# Patient Record
Sex: FEMALE | Race: WHITE | NOT HISPANIC OR LATINO | Employment: PART TIME | URBAN - METROPOLITAN AREA
[De-identification: names, ages, dates, MRNs, and addresses within clinical notes are randomized per-mention and may not be internally consistent; named-entity substitution may affect disease eponyms.]

---

## 2022-02-28 ENCOUNTER — TRANSFERRED RECORDS (OUTPATIENT)
Dept: HEALTH INFORMATION MANAGEMENT | Facility: CLINIC | Age: 67
End: 2022-02-28
Payer: COMMERCIAL

## 2022-03-01 ENCOUNTER — DOCUMENTATION ONLY (OUTPATIENT)
Dept: OTHER | Age: 67
End: 2022-03-01
Payer: COMMERCIAL

## 2022-03-01 ENCOUNTER — TRANSCRIBE ORDERS (OUTPATIENT)
Dept: ONCOLOGY | Facility: CLINIC | Age: 67
End: 2022-03-01

## 2022-03-01 ENCOUNTER — PATIENT OUTREACH (OUTPATIENT)
Dept: ONCOLOGY | Facility: CLINIC | Age: 67
End: 2022-03-01
Payer: COMMERCIAL

## 2022-03-01 ENCOUNTER — TRANSFERRED RECORDS (OUTPATIENT)
Dept: HEALTH INFORMATION MANAGEMENT | Facility: CLINIC | Age: 67
End: 2022-03-01

## 2022-03-01 DIAGNOSIS — C69.30 CHOROIDAL MALIGNANT MELANOMA (H): Primary | ICD-10-CM

## 2022-03-01 DIAGNOSIS — C69.30 MALIGNANT MELANOMA OF CHOROID, UNSPECIFIED LATERALITY (H): Primary | ICD-10-CM

## 2022-03-01 NOTE — PROGRESS NOTES
I rec'd a referral from Dr. 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 I have spoken to Barbara several times today. I sent the following e-mail with all of the information:    [facility]!    [address] [address]  Phone: [phone]  Toll-free: [phone]  ~Can do CT (chest, abdomen and pelvis) and labs.    Radiology (CT):    ~they will call you to schedule your CT.    ~I told them afternoon would be best  Phone:  [phone]    Labs:  ~once you have your date/time for the CT, please call lab appointment desk and set up you lab appointment.  Ask that it be done before or after your CT!  Lab appointment desk:  [phone]      Oncology Consult:  [address] [address]   Appointments: [phone]     Tuesday, March 22, 2022, arrival of 1:45 pm please!  1:45 pm:  rooming  2-3 pm:  meet with Dr. Claribel Price, Medical Oncologist  ~plan on about 90 minutes!    Let me know if you have any questions!  Josie Avila, RN, BSN        New Patient Oncology Nurse Navigator Note     Referring provider: Venkat Trotter M.D., Ph.D.  Vitreoretinal surgery & Ocular Oncology     Referred to (specialty): medical oncology    Requested provider (if applicable): Dr. Siu (out on leave)     Date Referral Received: 2/28/2022     Evaluation for : choroidal melanoma     Clinical History (per Nurse review of records provided):    ~see Dr. Trotter's note~      Clinical Assessment / Barriers to Care (Per Nurse):   none     Records Location (Care Everywhere, Media, etc.): see below     Records Needed: labs and CT to be done (Th 3/3/2022) at:  [address] [address]  Phone: [phone]  Toll-free: [phone]     Additional testing needed prior to consult: Th 3/10/2022  CT c/a/p & labs (CMP, cbc w/diff & LDH)

## 2022-03-01 NOTE — PROGRESS NOTES
Action 03/01/2022@305PM CDK   Action Taken REQUEST SENT TO [facility] FOR RECORDS AND IMAGING  CK

## 2022-03-03 ENCOUNTER — LAB REQUISITION (OUTPATIENT)
Dept: LAB | Facility: CLINIC | Age: 67
End: 2022-03-03

## 2022-03-03 ENCOUNTER — TRANSFERRED RECORDS (OUTPATIENT)
Dept: HEALTH INFORMATION MANAGEMENT | Facility: CLINIC | Age: 67
End: 2022-03-03
Payer: COMMERCIAL

## 2022-03-03 LAB
ALT SERPL-CCNC: 36 U/L (ref 16–63)
AST SERPL-CCNC: 30 U/L (ref 15–37)
CREATININE (EXTERNAL): 0.85 MG/DL (ref 0.6–1.3)
GFR ESTIMATED (EXTERNAL): >60 ML/MIN/1.73M2
GFR ESTIMATED (IF AFRICAN AMERICAN) (EXTERNAL): >60 ML/MIN/1.73M2
GLUCOSE (EXTERNAL): 95 MG/DL (ref 70–110)
LDH SERPL L TO P-CCNC: 269 U/L (ref 125–220)
POTASSIUM (EXTERNAL): 4.3 MMOL/L (ref 3.6–5.2)

## 2022-03-03 PROCEDURE — 83615 LACTATE (LD) (LDH) ENZYME: CPT

## 2022-03-09 NOTE — PROGRESS NOTES
RECORDS STATUS - ALL OTHER DIAGNOSIS      RECORDS RECEIVED FROM: [facility]/ [facility] in [place] / [facility] Records are in [facility]     DATE RECEIVED: 3/18/2022   NOTES STATUS DETAILS   OFFICE NOTE from referring provider     [facility] Records are in [facility]   Complete Dr. Trotter Recs March 1st 2022       DISCHARGE SUMMARY from hospital     DISCHARGE REPORT from the ER     OPERATIVE REPORT     CLINICAL TRIAL TREATMENTS TO DATE     LABS     PATHOLOGY REPORTS     ANYTHING RELATED TO DIAGNOSIS [facility]  Records are in [facility]- 3/3/2022   GENONOMIC TESTING     TYPE:     IMAGING (NEED IMAGES & REPORT)     CT SCANS Complete- [place]   [employer] Tracking Number:   [id number] CT Chest 3/3/2022    CT abdomen Pelvis 3/3/2022    MRI     MAMMO     ULTRASOUND     PET       Action    Action Taken 3/9/2022 2:45pm FORTINO   I called [facility] in [place] Phone: [phone] - I spoke to medical records and they looked up pt Barbara - they will fax over labs, office notes and image reports.     I was transferred to the radiology dept- they will need to mail out the image disc.     Ph: [phone]   Fax: [phone]    Attn: Radiology   Mailing Address:   [address] [place] [address]     [employer] Tracking Number: [id number]    3:17pm FORTINO   I faxed records from [place] to [facility]    3/16/2022 10:55AM FORTINO   I called pt Barbara - she mentioned having an appt on Feb 28th [facility] in [place] PH: [phone]     I called [facility] in [place] PH: [phone]   - they will fax over records from Feb 28th!

## 2022-03-18 ENCOUNTER — ONCOLOGY VISIT (OUTPATIENT)
Dept: ONCOLOGY | Facility: CLINIC | Age: 67
End: 2022-03-18
Attending: OPHTHALMOLOGY
Payer: COMMERCIAL

## 2022-03-18 ENCOUNTER — PATIENT OUTREACH (OUTPATIENT)
Dept: ONCOLOGY | Facility: CLINIC | Age: 67
End: 2022-03-18
Payer: COMMERCIAL

## 2022-03-18 ENCOUNTER — PRE VISIT (OUTPATIENT)
Dept: ONCOLOGY | Facility: CLINIC | Age: 67
End: 2022-03-18
Payer: COMMERCIAL

## 2022-03-18 VITALS
OXYGEN SATURATION: 96 % | HEIGHT: 65 IN | BODY MASS INDEX: 18.36 KG/M2 | SYSTOLIC BLOOD PRESSURE: 112 MMHG | DIASTOLIC BLOOD PRESSURE: 79 MMHG | RESPIRATION RATE: 16 BRPM | WEIGHT: 110.2 LBS | HEART RATE: 77 BPM

## 2022-03-18 DIAGNOSIS — C69.31 MALIGNANT MELANOMA OF CHOROID OF RIGHT EYE (H): ICD-10-CM

## 2022-03-18 PROCEDURE — G0463 HOSPITAL OUTPT CLINIC VISIT: HCPCS

## 2022-03-18 PROCEDURE — 99205 OFFICE O/P NEW HI 60 MIN: CPT | Performed by: INTERNAL MEDICINE

## 2022-03-18 ASSESSMENT — PAIN SCALES - GENERAL: PAINLEVEL: NO PAIN (0)

## 2022-03-18 NOTE — PROGRESS NOTES
"Oncology Rooming Note    March 18, 2022 11:12 AM   Barbara Singleton is a 66 year old female who presents for:    Chief Complaint   Patient presents with     Oncology Clinic Visit     new consult      Initial Vitals: /79   Pulse 77   Resp 16   Ht 1.651 m (5' 5\")   Wt 50 kg (110 lb 3.2 oz)   SpO2 96%   BMI 18.34 kg/m   Estimated body mass index is 18.34 kg/m  as calculated from the following:    Height as of this encounter: 1.651 m (5' 5\").    Weight as of this encounter: 50 kg (110 lb 3.2 oz). Body surface area is 1.51 meters squared.  No Pain (0) Comment: Data Unavailable   No LMP recorded.  Allergies reviewed: Yes  Medications reviewed: Yes    Medications: Medication refills not needed today.  Pharmacy name entered into [employer]: [id number] - [place], [place] - [address] [facility] AT [facility] [address]    Clinical concerns: new consult     Yanelis Steel            "

## 2022-03-18 NOTE — PROGRESS NOTES
Introduced self to patient and patient's daughter, Yoly. Explained by role as RN Care Coordinator. Contact information given.  Patient will follow-up ~ 4 months after treatment with Venkat Trotter M.D., Ph.D. Vitreoretinal surgery & Ocular Oncology/SHARI Bowen RN

## 2022-03-18 NOTE — LETTER
"    3/18/2022         RE: Barbara Singleton  [address] [facility] [address]        Dear Colleague,    Thank you for referring your patient, Barbara Singleton, to the [facility]. Please see a copy of my visit note below.    Oncology Rooming Note    March 18, 2022 11:12 AM   Barbara Singleton is a 66 year old female who presents for:    Chief Complaint   Patient presents with     Oncology Clinic Visit     new consult      Initial Vitals: /79   Pulse 77   Resp 16   Ht 1.651 m (5' 5\")   Wt 50 kg (110 lb 3.2 oz)   SpO2 96%   BMI 18.34 kg/m   Estimated body mass index is 18.34 kg/m  as calculated from the following:    Height as of this encounter: 1.651 m (5' 5\").    Weight as of this encounter: 50 kg (110 lb 3.2 oz). Body surface area is 1.51 meters squared.  No Pain (0) Comment: Data Unavailable   No LMP recorded.  Allergies reviewed: Yes  Medications reviewed: Yes    Medications: Medication refills not needed today.  Pharmacy name entered into [employer]: [facility] #[id number] [address] AT [facility]    Clinical concerns: new consult     Yanelis Steel              [facility] Hematology and Oncology Consult Note    Patient: Barbara Singleton  MRN: [id number]  Date of Service: Mar 18, 2022       Reason for Visit    Chief Complaint   Patient presents with     Oncology Clinic Visit     new consult          Assessment/Plan    #. Choroidal melanoma of the right eye  #. Weight loss of about 15 lbs likely from emotional distress.      Reviewed the clinical course, medical records. I reviewed labs including CBC, CMP, LDH and they are all unremarkable expect mildly elevated LDH. CT scan of chest/abd/pelvis was reviewed and there is no signs of metastatic melanoma. She was happy to hear that. I recommended to proceed with local treatment for primary choroidal melanoma per Dr. Trotter.   There is a risk of metastatic disease that I recommend systemic " imaging surveillance after completion of local treatment in addition to ophthalmology follow up.    Follow up in about 4-6 months and generally every 6-12 months surveillance exam and imaging by CT or CXR/US liver for 10 years, then clinically indicated according to NCCN guidelines.       ECOG Performance 0 - Independent    Encounter Diagnoses:    Problem List Items Addressed This Visit     None      Visit Diagnoses     Malignant melanoma of choroid of right eye (H)              ______________________________________________________________________________    Staging History  Cancer Staging  No matching staging information was found for the patient.      History    Ms. Barbara Singleton is a very pleasant 66 year old presented today accompanied by her daughter, Yoly.  She first presented with blurry vision of the right eye with flashes for a couple weeks duration. Further ophthalmologic evaluation concluded choroidal melanoma. She is here to discuss about the systemic disease evaluation piror treatment of choroidal melanoma. I requested labs and CT scan prior to this visit and she completed on 3/3/2022.    She normally weighs around 125 lbs. She lost some weight recently due to stress related to her marriage. Her appetite is good. Her energy level is at baseline. No headaches. No bone pain.    She has food intolerance/allergy. She presented with stomach pain, bloating but no diarrhea. She is following with an allergist.   She had left wrist surgery and bone graft in 2010.   Post hysterectomy in 1994.     She has farm and garden. She works in [employer] at [employer].She does not smoke. She drinks alcohol occasionally.   She has 2 daughters and 5 grandkids.  No family history of cancer.    Review of systems.  Apart from describing in history, the remainder of comprehensive ROS was negative.    Past History    No past medical history on file.  No past surgical history on file.  No family history on file.  Social  "History     Socioeconomic History     Marital status:      Spouse name: None     Number of children: None     Years of education: None     Highest education level: None   Occupational History     None   Tobacco Use     Smoking status: Never Smoker     Smokeless tobacco: Never Used   Substance and Sexual Activity     Alcohol use: Not Currently     Drug use: Not Currently     Sexual activity: None   Other Topics Concern     None   Social History Narrative     None     Social Determinants of Health     Financial Resource Strain: Not on file   Food Insecurity: Not on file   Transportation Needs: Not on file   Physical Activity: Not on file   Stress: Not on file   Social Connections: Not on file   Intimate Partner Violence: Not on file   Housing Stability: Not on file       Allergies    No Known Allergies    Physical Exam    /79   Pulse 77   Resp 16   Ht 1.651 m (5' 5\")   Wt 50 kg (110 lb 3.2 oz)   SpO2 96%   BMI 18.34 kg/m      General: alert, awake, not in acute distress. Thin female  HEENT: Head: Normal, normocephalic, atraumatic.  Eye: Normal external eye, conjunctiva, lids cornea, KASH.  Nose: Normal external nose, mucus membranes and septum.  Pharynx: Normal buccal mucosa. Normal pharynx.  Neck / Thyroid: Supple, no masses, nodes, nodules or enlargement.  Lymphatics: No abnormally enlarged lymph nodes.  Chest: Normal chest wall and respirations. Clear to auscultation.  Heart: S1 S2 RRR, no murmur.   Abdomen: abdomen is soft without significant tenderness, masses, organomegaly or guarding  Extremities: normal strength, tone, and muscle mass  Skin: normal. no rash or abnormalities  CNS: non focal.    Lab Results    No results found for this or any previous visit (from the past 168 hour(s)).    Imaging Results    No results found.    I spent 60 minutes on the date of service, of which greater than 50% of the time was spent on counseling of the patent about the above medical conditions, educating " patient on the medical conditions, treatment plan as well as coordination of care.    Signed by: Claribel Price MD         Again, thank you for allowing me to participate in the care of your patient.        Sincerely,        Claribel Price MD

## 2022-03-24 NOTE — PROGRESS NOTES
[facility] Hematology and Oncology Consult Note    Patient: Barbara Singleton  MRN: [id number]  Date of Service: Mar 18, 2022       Reason for Visit    Chief Complaint   Patient presents with     Oncology Clinic Visit     new consult          Assessment/Plan    #. Choroidal melanoma of the right eye  #. Weight loss of about 15 lbs likely from emotional distress.      Reviewed the clinical course, medical records. I reviewed labs including CBC, CMP, LDH and they are all unremarkable expect mildly elevated LDH. CT scan of chest/abd/pelvis was reviewed and there is no signs of metastatic melanoma. She was happy to hear that. I recommended to proceed with local treatment for primary choroidal melanoma per Dr. Trotter.   There is a risk of metastatic disease that I recommend systemic imaging surveillance after completion of local treatment in addition to ophthalmology follow up.    Follow up in about 4-6 months and generally every 6-12 months surveillance exam and imaging by CT or CXR/US liver for 10 years, then clinically indicated according to NCCN guidelines.       ECOG Performance 0 - Independent    Encounter Diagnoses:    Problem List Items Addressed This Visit     None      Visit Diagnoses     Malignant melanoma of choroid of right eye (H)              ______________________________________________________________________________    Staging History  Cancer Staging  No matching staging information was found for the patient.      History    Ms. Barbara Singleton is a very pleasant 66 year old presented today accompanied by her daughter, Yoly.  She first presented with blurry vision of the right eye with flashes for a couple weeks duration. Further ophthalmologic evaluation concluded choroidal melanoma. She is here to discuss about the systemic disease evaluation piror treatment of choroidal melanoma. I requested labs and CT scan prior to this visit and she completed on 3/3/2022.    She normally weighs around 125  "lbs. She lost some weight recently due to stress related to her marriage. Her appetite is good. Her energy level is at baseline. No headaches. No bone pain.    She has food intolerance/allergy. She presented with stomach pain, bloating but no diarrhea. She is following with an allergist.   She had left wrist surgery and bone graft in 2010.   Post hysterectomy in 1994.     She has farm and garden. She works in [employer] at [employer].She does not smoke. She drinks alcohol occasionally.   She has 2 daughters and 5 grandkids.  No family history of cancer.    Review of systems.  Apart from describing in history, the remainder of comprehensive ROS was negative.    Past History    No past medical history on file.  No past surgical history on file.  No family history on file.  Social History     Socioeconomic History     Marital status:      Spouse name: None     Number of children: None     Years of education: None     Highest education level: None   Occupational History     None   Tobacco Use     Smoking status: Never Smoker     Smokeless tobacco: Never Used   Substance and Sexual Activity     Alcohol use: Not Currently     Drug use: Not Currently     Sexual activity: None   Other Topics Concern     None   Social History Narrative     None     Social Determinants of Health     Financial Resource Strain: Not on file   Food Insecurity: Not on file   Transportation Needs: Not on file   Physical Activity: Not on file   Stress: Not on file   Social Connections: Not on file   Intimate Partner Violence: Not on file   Housing Stability: Not on file       Allergies    No Known Allergies    Physical Exam    /79   Pulse 77   Resp 16   Ht 1.651 m (5' 5\")   Wt 50 kg (110 lb 3.2 oz)   SpO2 96%   BMI 18.34 kg/m      General: alert, awake, not in acute distress. Thin female  HEENT: Head: Normal, normocephalic, atraumatic.  Eye: Normal external eye, conjunctiva, lids cornea, KASH.  Nose: Normal external nose, mucus " membranes and septum.  Pharynx: Normal buccal mucosa. Normal pharynx.  Neck / Thyroid: Supple, no masses, nodes, nodules or enlargement.  Lymphatics: No abnormally enlarged lymph nodes.  Chest: Normal chest wall and respirations. Clear to auscultation.  Heart: S1 S2 RRR, no murmur.   Abdomen: abdomen is soft without significant tenderness, masses, organomegaly or guarding  Extremities: normal strength, tone, and muscle mass  Skin: normal. no rash or abnormalities  CNS: non focal.    Lab Results    No results found for this or any previous visit (from the past 168 hour(s)).    Imaging Results    No results found.    I spent 60 minutes on the date of service, of which greater than 50% of the time was spent on counseling of the patent about the above medical conditions, educating patient on the medical conditions, treatment plan as well as coordination of care.    Signed by: Claribel Price MD

## 2022-04-05 ENCOUNTER — TRANSFERRED RECORDS (OUTPATIENT)
Dept: HEALTH INFORMATION MANAGEMENT | Facility: CLINIC | Age: 67
End: 2022-04-05
Payer: COMMERCIAL

## 2022-04-06 DIAGNOSIS — Z11.59 ENCOUNTER FOR SCREENING FOR OTHER VIRAL DISEASES: Primary | ICD-10-CM

## 2022-04-11 ENCOUNTER — TRANSFERRED RECORDS (OUTPATIENT)
Dept: HEALTH INFORMATION MANAGEMENT | Facility: CLINIC | Age: 67
End: 2022-04-11
Payer: COMMERCIAL

## 2022-04-11 ENCOUNTER — LAB REQUISITION (OUTPATIENT)
Dept: LAB | Facility: CLINIC | Age: 67
End: 2022-04-11

## 2022-04-11 DIAGNOSIS — Z01.818 ENCOUNTER FOR OTHER PREPROCEDURAL EXAMINATION: ICD-10-CM

## 2022-04-11 LAB
CREATININE (EXTERNAL): 0.8 MG/DL (ref 0.6–1.3)
GFR ESTIMATED (EXTERNAL): >60 ML/MIN/1.73M2
GFR ESTIMATED (IF AFRICAN AMERICAN) (EXTERNAL): >60 ML/MIN/1.73M2
GLUCOSE (EXTERNAL): 100 MG/DL (ref 70–110)
POTASSIUM (EXTERNAL): 5.2 MMOL/L (ref 3.6–5.2)

## 2022-04-11 PROCEDURE — U0003 INFECTIOUS AGENT DETECTION BY NUCLEIC ACID (DNA OR RNA); SEVERE ACUTE RESPIRATORY SYNDROME CORONAVIRUS 2 (SARS-COV-2) (CORONAVIRUS DISEASE [COVID-19]), AMPLIFIED PROBE TECHNIQUE, MAKING USE OF HIGH THROUGHPUT TECHNOLOGIES AS DESCRIBED BY CMS-2020-01-R: HCPCS | Performed by: FAMILY MEDICINE

## 2022-04-12 LAB — SARS-COV-2 RNA RESP QL NAA+PROBE: NEGATIVE

## 2022-04-14 ENCOUNTER — ANESTHESIA EVENT (OUTPATIENT)
Dept: SURGERY | Facility: CLINIC | Age: 67
End: 2022-04-14
Payer: COMMERCIAL

## 2022-04-15 ENCOUNTER — HOSPITAL ENCOUNTER (OUTPATIENT)
Facility: CLINIC | Age: 67
Discharge: HOME | End: 2022-04-15
Attending: OPHTHALMOLOGY | Admitting: OPHTHALMOLOGY
Payer: COMMERCIAL

## 2022-04-15 ENCOUNTER — ANESTHESIA (OUTPATIENT)
Dept: SURGERY | Facility: CLINIC | Age: 67
End: 2022-04-15
Payer: COMMERCIAL

## 2022-04-15 VITALS
HEART RATE: 92 BPM | RESPIRATION RATE: 11 BRPM | OXYGEN SATURATION: 96 % | SYSTOLIC BLOOD PRESSURE: 116 MMHG | TEMPERATURE: 99 F | HEIGHT: 65 IN | BODY MASS INDEX: 18.36 KG/M2 | WEIGHT: 110.2 LBS | DIASTOLIC BLOOD PRESSURE: 57 MMHG

## 2022-04-15 DIAGNOSIS — C69.31 MALIGNANT MELANOMA OF CHOROID OF RIGHT EYE (H): Primary | ICD-10-CM

## 2022-04-15 PROCEDURE — C1713 ANCHOR/SCREW BN/BN,TIS/BN: HCPCS | Performed by: OPHTHALMOLOGY

## 2022-04-15 PROCEDURE — 360N000076 HC SURGERY LEVEL 3, PER MIN: Performed by: OPHTHALMOLOGY

## 2022-04-15 PROCEDURE — 370N000017 HC ANESTHESIA TECHNICAL FEE, PER MIN: Performed by: OPHTHALMOLOGY

## 2022-04-15 PROCEDURE — 250N000009 HC RX 250: Performed by: OPHTHALMOLOGY

## 2022-04-15 PROCEDURE — 710N000012 HC RECOVERY PHASE 2, PER MINUTE: Performed by: OPHTHALMOLOGY

## 2022-04-15 PROCEDURE — 250N000025 HC SEVOFLURANE, PER MIN: Performed by: OPHTHALMOLOGY

## 2022-04-15 PROCEDURE — 250N000011 HC RX IP 250 OP 636: Performed by: OPHTHALMOLOGY

## 2022-04-15 PROCEDURE — 999N000141 HC STATISTIC PRE-PROCEDURE NURSING ASSESSMENT: Performed by: OPHTHALMOLOGY

## 2022-04-15 PROCEDURE — 258N000003 HC RX IP 258 OP 636: Performed by: ANESTHESIOLOGY

## 2022-04-15 PROCEDURE — 710N000010 HC RECOVERY PHASE 1, LEVEL 2, PER MIN: Performed by: OPHTHALMOLOGY

## 2022-04-15 PROCEDURE — 258N000003 HC RX IP 258 OP 636: Performed by: NURSE ANESTHETIST, CERTIFIED REGISTERED

## 2022-04-15 PROCEDURE — 250N000011 HC RX IP 250 OP 636: Performed by: NURSE ANESTHETIST, CERTIFIED REGISTERED

## 2022-04-15 PROCEDURE — 250N000009 HC RX 250: Performed by: NURSE ANESTHETIST, CERTIFIED REGISTERED

## 2022-04-15 PROCEDURE — 272N000001 HC OR GENERAL SUPPLY STERILE: Performed by: OPHTHALMOLOGY

## 2022-04-15 RX ORDER — SODIUM CHLORIDE, SODIUM LACTATE, POTASSIUM CHLORIDE, CALCIUM CHLORIDE 600; 310; 30; 20 MG/100ML; MG/100ML; MG/100ML; MG/100ML
INJECTION, SOLUTION INTRAVENOUS CONTINUOUS
Status: DISCONTINUED | OUTPATIENT
Start: 2022-04-15 | End: 2022-04-15 | Stop reason: HOSPADM

## 2022-04-15 RX ORDER — DEXAMETHASONE SODIUM PHOSPHATE 10 MG/ML
INJECTION, SOLUTION INTRAMUSCULAR; INTRAVENOUS PRN
Status: DISCONTINUED | OUTPATIENT
Start: 2022-04-15 | End: 2022-04-15

## 2022-04-15 RX ORDER — HYDROCODONE BITARTRATE AND ACETAMINOPHEN 5; 325 MG/1; MG/1
1 TABLET ORAL EVERY 6 HOURS PRN
Qty: 18 TABLET | Refills: 0 | Status: SHIPPED | OUTPATIENT
Start: 2022-04-15 | End: 2022-04-18

## 2022-04-15 RX ORDER — FENTANYL CITRATE 50 UG/ML
25 INJECTION, SOLUTION INTRAMUSCULAR; INTRAVENOUS EVERY 5 MIN PRN
Status: DISCONTINUED | OUTPATIENT
Start: 2022-04-15 | End: 2022-04-15 | Stop reason: HOSPADM

## 2022-04-15 RX ORDER — HYDROMORPHONE HCL IN WATER/PF 6 MG/30 ML
0.2 PATIENT CONTROLLED ANALGESIA SYRINGE INTRAVENOUS EVERY 5 MIN PRN
Status: DISCONTINUED | OUTPATIENT
Start: 2022-04-15 | End: 2022-04-15 | Stop reason: HOSPADM

## 2022-04-15 RX ORDER — PROPOFOL 10 MG/ML
INJECTION, EMULSION INTRAVENOUS PRN
Status: DISCONTINUED | OUTPATIENT
Start: 2022-04-15 | End: 2022-04-15

## 2022-04-15 RX ORDER — ONDANSETRON 4 MG/1
4 TABLET, ORALLY DISINTEGRATING ORAL EVERY 30 MIN PRN
Status: DISCONTINUED | OUTPATIENT
Start: 2022-04-15 | End: 2022-04-15 | Stop reason: HOSPADM

## 2022-04-15 RX ORDER — EPHEDRINE SULFATE 50 MG/ML
INJECTION, SOLUTION INTRAMUSCULAR; INTRAVENOUS; SUBCUTANEOUS PRN
Status: DISCONTINUED | OUTPATIENT
Start: 2022-04-15 | End: 2022-04-15

## 2022-04-15 RX ORDER — OXYCODONE HYDROCHLORIDE 5 MG/1
5 TABLET ORAL EVERY 4 HOURS PRN
Status: DISCONTINUED | OUTPATIENT
Start: 2022-04-15 | End: 2022-04-15 | Stop reason: HOSPADM

## 2022-04-15 RX ORDER — ONDANSETRON 2 MG/ML
4 INJECTION INTRAMUSCULAR; INTRAVENOUS EVERY 30 MIN PRN
Status: DISCONTINUED | OUTPATIENT
Start: 2022-04-15 | End: 2022-04-15 | Stop reason: HOSPADM

## 2022-04-15 RX ORDER — LIDOCAINE 40 MG/G
CREAM TOPICAL
Status: DISCONTINUED | OUTPATIENT
Start: 2022-04-15 | End: 2022-04-15 | Stop reason: HOSPADM

## 2022-04-15 RX ORDER — ERYTHROMYCIN 5 MG/G
OINTMENT OPHTHALMIC
Qty: 7 G | Refills: 1 | Status: SHIPPED | OUTPATIENT
Start: 2022-04-15

## 2022-04-15 RX ORDER — GLYCOPYRROLATE 0.2 MG/ML
INJECTION, SOLUTION INTRAMUSCULAR; INTRAVENOUS PRN
Status: DISCONTINUED | OUTPATIENT
Start: 2022-04-15 | End: 2022-04-15

## 2022-04-15 RX ORDER — FENTANYL CITRATE 50 UG/ML
INJECTION, SOLUTION INTRAMUSCULAR; INTRAVENOUS PRN
Status: DISCONTINUED | OUTPATIENT
Start: 2022-04-15 | End: 2022-04-15

## 2022-04-15 RX ORDER — LIDOCAINE HYDROCHLORIDE 10 MG/ML
INJECTION, SOLUTION INFILTRATION; PERINEURAL PRN
Status: DISCONTINUED | OUTPATIENT
Start: 2022-04-15 | End: 2022-04-15

## 2022-04-15 RX ORDER — FENTANYL CITRATE 50 UG/ML
25 INJECTION, SOLUTION INTRAMUSCULAR; INTRAVENOUS
Status: DISCONTINUED | OUTPATIENT
Start: 2022-04-15 | End: 2022-04-15 | Stop reason: HOSPADM

## 2022-04-15 RX ORDER — KETAMINE HYDROCHLORIDE 50 MG/ML
INJECTION, SOLUTION INTRAMUSCULAR; INTRAVENOUS PRN
Status: DISCONTINUED | OUTPATIENT
Start: 2022-04-15 | End: 2022-04-15

## 2022-04-15 RX ADMIN — PHENYLEPHRINE HYDROCHLORIDE 100 MCG: 10 INJECTION INTRAVENOUS at 08:04

## 2022-04-15 RX ADMIN — FENTANYL CITRATE 50 MCG: 50 INJECTION, SOLUTION INTRAMUSCULAR; INTRAVENOUS at 08:05

## 2022-04-15 RX ADMIN — PHENYLEPHRINE HYDROCHLORIDE 100 MCG: 10 INJECTION INTRAVENOUS at 07:58

## 2022-04-15 RX ADMIN — PROPOFOL 160 MG: 10 INJECTION, EMULSION INTRAVENOUS at 07:55

## 2022-04-15 RX ADMIN — GLYCOPYRROLATE 0.2 MG: 0.2 INJECTION, SOLUTION INTRAMUSCULAR; INTRAVENOUS at 07:57

## 2022-04-15 RX ADMIN — DEXAMETHASONE SODIUM PHOSPHATE 10 MG: 10 INJECTION, SOLUTION INTRAMUSCULAR; INTRAVENOUS at 07:55

## 2022-04-15 RX ADMIN — FENTANYL CITRATE 50 MCG: 50 INJECTION, SOLUTION INTRAMUSCULAR; INTRAVENOUS at 07:57

## 2022-04-15 RX ADMIN — Medication 10 MG: at 07:56

## 2022-04-15 RX ADMIN — PHENYLEPHRINE HYDROCHLORIDE 100 MCG: 10 INJECTION INTRAVENOUS at 08:11

## 2022-04-15 RX ADMIN — LIDOCAINE HYDROCHLORIDE 20 MG: 10 INJECTION, SOLUTION INFILTRATION; PERINEURAL at 07:55

## 2022-04-15 RX ADMIN — Medication 10 MG: at 08:39

## 2022-04-15 RX ADMIN — HYDROMORPHONE HYDROCHLORIDE 0.5 MG: 1 INJECTION, SOLUTION INTRAMUSCULAR; INTRAVENOUS; SUBCUTANEOUS at 09:03

## 2022-04-15 RX ADMIN — SODIUM CHLORIDE, POTASSIUM CHLORIDE, SODIUM LACTATE AND CALCIUM CHLORIDE: 600; 310; 30; 20 INJECTION, SOLUTION INTRAVENOUS at 08:54

## 2022-04-15 RX ADMIN — PHENYLEPHRINE HYDROCHLORIDE 100 MCG: 10 INJECTION INTRAVENOUS at 08:18

## 2022-04-15 RX ADMIN — MIDAZOLAM 1 MG: 1 INJECTION INTRAMUSCULAR; INTRAVENOUS at 07:43

## 2022-04-15 RX ADMIN — SODIUM CHLORIDE, POTASSIUM CHLORIDE, SODIUM LACTATE AND CALCIUM CHLORIDE: 600; 310; 30; 20 INJECTION, SOLUTION INTRAVENOUS at 06:58

## 2022-04-15 RX ADMIN — Medication 5 MG: at 08:52

## 2022-04-15 RX ADMIN — PHENYLEPHRINE HYDROCHLORIDE 50 MCG: 10 INJECTION INTRAVENOUS at 08:53

## 2022-04-15 RX ADMIN — KETAMINE HYDROCHLORIDE 25 MG: 50 INJECTION, SOLUTION INTRAMUSCULAR; INTRAVENOUS at 08:07

## 2022-04-15 NOTE — OP NOTE
PREOPERATIVE DIAGNOSIS: Melanoma, right eye    POSTOPERATIVE DIAGNOSIS: Melanoma, right eye    PROCEDURE: Right eye enucleation with placement of a Medpor SST-EZ 20mm implant with the muscles attached.     SURGEON: Mya Posadas MD     ANESTHESIA: General anesthesia.     COMPLICATIONS: None.     SPECIMEN:Right eye for pathologic evaluation.     ESTIMATED BLOOD LOSS: Less than 5 mL.     HISTORY AND INDICATIONS: Barbara presented with a painful blind right eye. After the risks, benefits and alternatives to the proposed procedure were explained, informed consent was obtained.     DESCRIPTION OF PROCEDURE: Barbara was brought to the operating room and placed supine on the operating table. General anesthesia was induced. The right  eye was prepped and draped in the typical sterile ophthalmic fashion. Attention was directed to the right  side. A Garcia lid speculum was placed to separate the eyelids. A 360-degree conjunctival peritomy was performed with the christiano scissors. Dissection was carried into the quadrants with the Petersen tenotomy scissors. Each rectus muscle was hooked with the Joe muscle hook,  tagged with a double-armed 5-0 Vicryl suture and cut free from the globe. The inferior oblique muscle was identified, cauterized and cut free from the globe. The optic nerve was clamped with a hemostat and severed with the enucleation scissors. The hemostasis was obtained with pressure and bipolar cautery. The implant sizer spheres were used to determine the correct implant size and a 20 mm implant was selected.  The implant was placed in the antibiotic solution. The implant was placed in the socket using the sphere introducer. The rectus muscles were sutured their respective muscle windows in the implant with the pre-placed 5-0 Vicryl sutures. Tenon's layer was closed with interrupted buried 5-0 Vicryl sutures. The conjunctiva was closed with running 6-0 plain gut suture. Antibiotic ointment, a conformer and a  pressure patch were placed. The patient tolerated the procedure well and  left the operating room in stable condition after being awoken from general anesthesia.

## 2022-04-15 NOTE — ANESTHESIA CARE TRANSFER NOTE
Patient: Barbara Singleton    Procedure: Procedure(s):  ENUCLEATION OR RIGHT EYE       Diagnosis: Choroid melanoma of right eye (H) [C69.31]  Diagnosis Additional Information: No value filed.    Anesthesia Type:   General     Note:    Oropharynx: oropharynx clear of all foreign objects  Level of Consciousness: awake  Oxygen Supplementation: face mask  Level of Supplemental Oxygen (L/min / FiO2): 6  Independent Airway: airway patency satisfactory and stable  Dentition: dentition unchanged  Vital Signs Stable: post-procedure vital signs reviewed and stable  Report to RN Given: handoff report given  Patient transferred to: PACU    Handoff Report: Identifed the Patient, Identified the Reponsible Provider, Reviewed the pertinent medical history, Discussed the surgical course, Reviewed Intra-OP anesthesia mangement and issues during anesthesia, Set expectations for post-procedure period and Allowed opportunity for questions and acknowledgement of understanding      Vitals:  Vitals Value Taken Time   /57 04/15/22 0926   Temp 37.3  C (99.1  F) 04/15/22 0926   Pulse 98 04/15/22 0927   Resp 4 04/15/22 0927   SpO2 99 % 04/15/22 0927   Vitals shown include unvalidated device data.    Electronically Signed By: JODIE Ignacio CRNA  April 15, 2022  9:29 AM

## 2022-04-15 NOTE — BRIEF OP NOTE
[facility]    Brief Operative Note    Pre-operative diagnosis: Choroid melanoma of right eye (H) [C69.31]  Post-operative diagnosis Same as pre-operative diagnosis    Procedure: Procedure(s):  ENUCLEATION OR RIGHT EYE  Surgeon: Surgeon(s) and Role:     * Mya Posadas MD - Primary  Anesthesia: General   Estimated Blood Loss: Minimal    Drains: None  Specimens:   ID Type Source Tests Collected by Time Destination   1 : Send fresh to [facility] c\o Kim Huang for castle studies Tissue Eye, Right SURGICAL PATHOLOGY EXAM Mya Posadas MD 4/15/2022  8:34 AM      Findings:   None.  Complications: None.  Implants:   Implant Name Type Inv. Item Serial No.  Lot No. LRB No. Used Action   Medpor surgical implant shpere with enucleation implant      [id number] Right 1 Implanted

## 2022-04-15 NOTE — ANESTHESIA PREPROCEDURE EVALUATION
Anesthesia Pre-Procedure Evaluation    Patient: Barbara Singleton   MRN: [id number] : 1955        Procedure : Procedure(s):  ENUCLEATION OR RIGHT EYE          History reviewed. No pertinent past medical history.   Past Surgical History:   Procedure Laterality Date     GYN SURGERY       HYSTERECTOMY        No Known Allergies   Social History     Tobacco Use     Smoking status: Never Smoker     Smokeless tobacco: Never Used   Substance Use Topics     Alcohol use: Not Currently      Wt Readings from Last 1 Encounters:   04/15/22 50 kg (110 lb 3.2 oz)        Anesthesia Evaluation   Pt has had prior anesthetic.     No history of anesthetic complications       ROS/MED HX  ENT/Pulmonary:  - neg pulmonary ROS     Neurologic:  - neg neurologic ROS     Cardiovascular:  - neg cardiovascular ROS  (-) murmur and wheezes   METS/Exercise Tolerance: >4 METS    Hematologic:  - neg hematologic  ROS     Musculoskeletal:  - neg musculoskeletal ROS     GI/Hepatic:  - neg GI/hepatic ROS     Renal/Genitourinary:  - neg Renal ROS     Endo:  - neg endo ROS     Psychiatric/Substance Use:  - neg psychiatric ROS     Infectious Disease:  - neg infectious disease ROS     Malignancy:  - neg malignancy ROS     Other:  - neg other ROS          Physical Exam    Airway  airway exam normal      Mallampati: I   TM distance: > 3 FB   Neck ROM: full   Mouth opening: > 3 cm    Respiratory Devices and Support         Dental  no notable dental history         Cardiovascular          Rhythm and rate: regular and normal (-) no murmur    Pulmonary           breath sounds clear to auscultation   (-) no wheezes        OUTSIDE LABS:  CBC: No results found for: WBC, HGB, HCT, PLT  BMP: No results found for: NA, POTASSIUM, CHLORIDE, CO2, BUN, CR, GLC  COAGS: No results found for: PTT, INR, FIBR  POC: No results found for: BGM, HCG, HCGS  HEPATIC: No results found for: ALBUMIN, PROTTOTAL, ALT, AST, GGT, ALKPHOS, BILITOTAL, BILIDIRECT, EDDIE  OTHER: No results  found for: PH, LACT, A1C, OG, PHOS, MAG, LIPASE, AMYLASE, TSH, T4, T3, CRP, SED    Anesthesia Plan    ASA Status:  1   NPO Status:  NPO Appropriate    Anesthesia Type: General.     - Airway: LMA   Induction: Intravenous, Propofol.   Maintenance: TIVA.        Consents    Anesthesia Plan(s) and associated risks, benefits, and realistic alternatives discussed. Questions answered and patient/representative(s) expressed understanding.     - Discussed: Risks, Benefits and Alternatives for BOTH SEDATION and the PROCEDURE were discussed     - Discussed with:  Patient      - Patient is DNR/DNI Status: No         Postoperative Care    Pain management: IV analgesics, Oral pain medications.   PONV prophylaxis: Ondansetron (or other 5HT-3), Dexamethasone or Solumedrol     Comments:                Daniel Hernandes MD

## 2022-04-15 NOTE — ANESTHESIA POSTPROCEDURE EVALUATION
Patient: Barbara Singleton    Procedure: Procedure(s):  ENUCLEATION OR RIGHT EYE       Anesthesia Type:  General    Note:  Disposition: Outpatient   Postop Pain Control: Uneventful            Sign Out: Well controlled pain   PONV: No   Neuro/Psych: Uneventful            Sign Out: Acceptable/Baseline neuro status   Airway/Respiratory: Uneventful            Sign Out: Acceptable/Baseline resp. status   CV/Hemodynamics: Uneventful            Sign Out: Acceptable CV status; No obvious hypovolemia; No obvious fluid overload   Other NRE: NONE   DID A NON-ROUTINE EVENT OCCUR? No           Last vitals:  Vitals Value Taken Time   /55 04/15/22 0940   Temp 37.3  C (99.1  F) 04/15/22 0926   Pulse 92 04/15/22 0941   Resp 11 04/15/22 0940   SpO2 96 % 04/15/22 0941   Vitals shown include unvalidated device data.    Electronically Signed By: Daniel Hernandes MD  April 15, 2022  11:24 AM

## 2022-04-15 NOTE — ANESTHESIA PROCEDURE NOTES
Airway       Patient location during procedure: OR       Procedure Start/Stop Times: 4/15/2022 7:56 AM and 4/15/2022 7:57 AM  Staff -        CRNA: Tika Gonzales APRN CRNA       Performed By: CRNA  Consent for Airway        Urgency: elective  Indications and Patient Condition       Indications for airway management: avelina-procedural       Induction type:intravenous       Mask difficulty assessment: 0 - not attempted    Final Airway Details       Final airway type: supraglottic airway    Supraglottic Airway Details        Type: LMA       Brand: AuraStraight       LMA size: 4    Post intubation assessment        Placement verified by: capnometry, equal breath sounds and chest rise        Number of attempts at approach: 1       Number of other approaches attempted: 0       Ease of procedure: easy       Dentition: Unchanged    Medication(s) Administered   Medication Administration Time: 4/15/2022 7:56 AM

## 2022-04-15 NOTE — DISCHARGE INSTRUCTIONS
Post-operative Enucleation Surgery Instructions    Ophthalmic Plastic and Reconstructive Surgery-[facility]  Mya Posadas M.D.    All instructions apply to the operated side    What to expect after surgery:  There will be some swelling, bruising, and likely a black eye (even into the lower eyelids and cheeks). Also expect crusting and discharge from the eye and/or incisions.   A small amount of surface bleeding is normal for the first 48 hours after surgery.  You may notice some bloody tears for the first few days after surgery. This is normal.  Your eye(s) and eyelid(s) may be painful and tender. This is normal after surgery. Use the pain medication as prescribed. If your pain does not improve despite the medication, contact the office.  You may have some temporary double vision with both eyes open after eye socket surgery.  We will monitor this at your follow up visits.  A mild itchy sensation of the skin around the incision is normal as the wound is healing.  Warm compresses can help alleviate this.    Wound and personal care:  A patch has been placed during your surgery. Please leave this patch in place until your first postoperative clinic visit, at which point it will be removed.  After the patch is removed, you may find either cool or warm compresses comforting. Either is OK as desired.  For warm packs you can place a cup of dry uncooked rice in a clean cotton sock. Place sock in microwave 30 seconds to one minute. Next place the warm sock into a plastic bag and wrap the bag with clean warm wet washcloth and place over operated eye.    You may shower or wash your hair the day after surgery with your head facing away from the water stream. Do not bathe or go swimming for 1 week to prevent contamination of your wounds.    Activity restrictions and driving:  Avoid heavy lifting (greater than 20 pounds), bending with the head down, exercise or strenuous activity for 1 week after surgery.  You  may resume other activities and return to work as tolerated.  You may not resume driving until have you stopped using narcotic pain medications (such as Norco, Percocet, Tylenol #3).    Medications:  Restart all your regular home medications and eye drops today except for any blood thinners.   You may restart coumadin (Warfarin) the evening after surgery  If you take Plavix or Aspirin on a regular basis, wait for 3 days after your surgery before restarting these in order to decrease the risk of bleeding complications.  If you take rivaroxaban (Xarelto) or apixaban (Eliquis), please wait 2 days after surgery to restart the medication.  Please avoid aspirin and aspirin-like medications (Motrin, Aleve, Ibuprofen, [place] etc) for 5 days to reduce the risk of bleeding. You may take Tylenol (acetaminophen) for pain.  In addition to your home medications, take the following post-operative medications as prescribed by your physician:  Apply antibiotic ointment (erythromycin/Bacitracin) to the eyelid margin of the operated side once daily after the patch is removed.   Take 1 to 2 pain pills (norco or tylenol 3 as prescribed) as needed for pain up to every 4 hours.  The pain pills may make you drowsy. You must not drive a car, operate heavy machinery or drink alcohol while taking them.  The pain pills may cause constipation and nausea. Take them with some food to prevent a stomach upset. If you continue to experience nausea, call your physician.    WARNING: All the prescription pain medications listed above contain Tylenol (acetaminophen). You must not take more than 4,000 mg of acetaminophen per 24-hour period. This is equivalent to 6 tablets of Darvocet, 8 tablets of Vicodin, or 12 tablets of Norco, Percocet or Tylenol #3. If you take other over-the-counter medications containing acetaminophen, you must take the amount of acetaminophen into account and reduce the number of prescribed pain pills accordingly.    Contact  information and follow-up:  Return to the Eye Clinic for a follow-up appointment with Dr. Posadas as scheduled. If no appointment has been scheduled, call [phone] for an appointment with Dr. Posadas within 5-7 days after your surgery.      For severe pain, bleeding, or loss of vision, call [facility] at [phone] or [phone], 24 hours a day.

## 2022-04-18 PROCEDURE — 88307 TISSUE EXAM BY PATHOLOGIST: CPT | Mod: 26 | Performed by: OPHTHALMOLOGY

## 2022-04-18 PROCEDURE — 88313 SPECIAL STAINS GROUP 2: CPT | Mod: 26 | Performed by: OPHTHALMOLOGY

## 2022-04-18 PROCEDURE — 88313 SPECIAL STAINS GROUP 2: CPT | Mod: TC | Performed by: OPHTHALMOLOGY

## 2022-04-21 LAB
PATH REPORT.COMMENTS IMP SPEC: ABNORMAL
PATH REPORT.COMMENTS IMP SPEC: ABNORMAL
PATH REPORT.COMMENTS IMP SPEC: YES
PATH REPORT.FINAL DX SPEC: ABNORMAL
PATH REPORT.GROSS SPEC: ABNORMAL
PATH REPORT.MICROSCOPIC SPEC OTHER STN: ABNORMAL
PATH REPORT.RELEVANT HX SPEC: ABNORMAL
PATHOLOGY SYNOPTIC REPORT: ABNORMAL
PHOTO IMAGE: ABNORMAL

## 2022-06-15 DIAGNOSIS — C69.31 MALIGNANT MELANOMA OF CHOROID OF RIGHT EYE (H): Primary | ICD-10-CM

## 2022-07-07 ENCOUNTER — PATIENT OUTREACH (OUTPATIENT)
Dept: ONCOLOGY | Facility: CLINIC | Age: 67
End: 2022-07-07

## 2022-07-07 NOTE — PROGRESS NOTES
[facility]: Cancer Care Short Note                                    Discussion with Patient:                                                      Patient called and left a message stating that she has an appointment on 9/14 with Dr. Price and due for some blood work.  She is wondering if can have labs drawn while she is having her CT at [facility]..  She asked for call back to [phone].    Assessment:                                                      Called patient back. Patient reports CT scheduled for 8/25/22 at [facility].      Intervention/Education provided during outreach:                                                       Told patient will fax lab orders to [facility] and will also mail her a copy.  Told patient will request they fax results to us. Told her will watch for CT and lab results and make sure we have them before her appointment on 9/14/22.  Patient agreed with plan and verbalized understanding.    Called Federica at [facility] at [phone].  She said to fax orders to [phone].  Lab orders faxed at [phone].  Right Fax confirmed sent.    Lab orders mailed to patient.    Patient to follow up as scheduled at next appt    Signature:  Andressa Bowen RN

## 2022-08-25 ENCOUNTER — TRANSFERRED RECORDS (OUTPATIENT)
Dept: HEALTH INFORMATION MANAGEMENT | Facility: CLINIC | Age: 67
End: 2022-08-25

## 2022-08-25 ENCOUNTER — LAB REQUISITION (OUTPATIENT)
Dept: LAB | Facility: CLINIC | Age: 67
End: 2022-08-25

## 2022-08-25 LAB — LDH SERPL L TO P-CCNC: 335 U/L (ref 0–250)

## 2022-08-25 PROCEDURE — 83615 LACTATE (LD) (LDH) ENZYME: CPT

## 2022-09-14 ENCOUNTER — VIRTUAL VISIT (OUTPATIENT)
Dept: ONCOLOGY | Facility: CLINIC | Age: 67
End: 2022-09-14
Attending: INTERNAL MEDICINE
Payer: COMMERCIAL

## 2022-09-14 DIAGNOSIS — C69.31 MALIGNANT MELANOMA OF CHOROID OF RIGHT EYE (H): Primary | ICD-10-CM

## 2022-09-14 PROCEDURE — 99214 OFFICE O/P EST MOD 30 MIN: CPT | Mod: 95 | Performed by: INTERNAL MEDICINE

## 2022-09-14 ASSESSMENT — PAIN SCALES - GENERAL: PAINLEVEL: NO PAIN (0)

## 2022-09-14 NOTE — PROGRESS NOTES
[facility] Hematology and Oncology Progress Note    Patient: Barbara Singleton  MRN: [id number]  Date of Service: Sep 14, 2022         Reason for Visit    Chief Complaint   Patient presents with     Oncology Clinic Visit       Assessment and Plan    Cancer Staging  No matching staging information was found for the patient.    ECOG Performance    0 - Independent     Pain  Pain Score: No Pain (0)      #. Choroidal melanoma of the right eye  #. Weight loss, reportedly stabilized.    #.  Fatty liver     She reportedly clinically well.  I reviewed her labs including LDL which is persistently elevated of unknown significance.  I reviewed the CT scan of chest/abdomen/pelvis and that did not show any evidence of metastatic disease.  Radiologist recommended MRI liver with and without contrast for better sensitivity.     I recommended follow-up labs and provider visit in 6 months for surveillance.  I reviewed the NCCN guidelines and discussed about recommended surveillance of 4-6 months and generally every 6-12 months surveillance exam and imaging by CT or CXR chest/US liver or MR liver for 10 years, then clinically indicated.   She is recommended to continue follow-up with ophthalmology.   She is advised to call me sooner with any concerns.    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 Further ophthalmologic evaluation concluded choroidal melanoma.   3/3/2022 -no evidence of systemic metastases.    Treatment  to date  4/15/2022- right eye enucleation     History of Present Illness    Ms. Babrara Singleton reported she is doing well.  She recovered from surgery well.  She has some indigestion symptoms.  She has good appetite.  She does not have headache.    Review of systems  Apart from describing in HPI, the remainder of comprehensive ROS was negative.    Past History    No past medical history on file.    Past Surgical History:   Procedure Laterality Date     ENUCLEATION Right 4/15/2022    Procedure: ENUCLEATION OR RIGHT EYE;  Surgeon: Mya Posadas MD;  Location: [facility] OR     GYN SURGERY       HYSTERECTOMY         Physical Exam    There were no vitals taken for this visit.  This is a video visit.  Limited video exam showed well-appearing female.  Not in acute distress.  Artificial right eye.   Skin: normal. no rash or abnormalities  CNS: non focal.    Lab Results    No results found for this or any previous visit (from the past 168 hour(s)).    Imaging    CT External Imaging Abdomen    Result Date: 9/2/2022  Images were obtained from an external facility.  Click PACS Images hyperlink to view images.  Textual results have been scanned into the media tab.    CT External Imaging Chest    Result Date: 9/2/2022  Images were obtained from an external facility.  Click PACS Images hyperlink to view images.  Textual results have been scanned into the media tab.    Video platform used: [employer]  Video start time: 2:40 PM  Video end time: 2:50 PM    30 minutes spent on the date of the encounter doing chart review, history and exam, documentation and further activities as noted above.    Signed by: Claribel Price MD

## 2023-03-28 ENCOUNTER — TELEPHONE (OUTPATIENT)
Dept: ONCOLOGY | Facility: CLINIC | Age: 68
End: 2023-03-28
Payer: COMMERCIAL

## 2023-08-30 ENCOUNTER — TELEPHONE (OUTPATIENT)
Dept: ONCOLOGY | Facility: CLINIC | Age: 68
End: 2023-08-30
Payer: COMMERCIAL

## 2023-08-30 NOTE — TELEPHONE ENCOUNTER
Federica from [facility] (P: [phone], F: [phone]) calls in regarding orders placed 9/14/22 for CT chest with contrast and MR liver w/wo contrast.     Patient's insurance changed on 1/01/23.   [employer] Medicare Advantage Member ID: [id number] Group: [id number]    Need to determine if patient intends to continue to be seen here , in-network, etc.  Federica will call patient and have her check with her insurance and contact us.    If patient intends to be seen, we will clarify with Dr. Price:  Need clarification of orders as last OV note from 9/14/22 says Dr. Price reviewed CT scan of chest/abdomen/pelvis. But ordered CT chest with contrast. Please verify and place, new preferred order. Also need new order MR liver w/wo contrast.  Federica will verify payment if patient intends on having imaging done at [facility].    Called patient, she said she checked with her insurance and although we are not in network, Medicare and her insurance will cover some; she will have co-pay.   Please place new orders for CT and MR liver so orders can be faxed to [facility], aggie [place].     Martine Cameron RN

## 2023-08-31 NOTE — TELEPHONE ENCOUNTER
"[facility]: Cancer Care                                                                                          Called and talked with Federica at [facility] at [phone] to clarify orders needed.  She said notes and orders for MRI and CT are old. Explained patient last seen on 22 so that is the last note we have for her and her imaging orders don't  until 23.  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  She said she is on vacation next week and may not get to it until tomorrow.  Told her patient is scheduled to see Dr. Price on 23 and if unable to get imaging done before then this appointment will need to be scheduled. She verbalized understanding.  She said she will work on the orders she has and will contact us if she needs anything additional.      CT chest order also questioned wondering if CT chest, abd, pelvis needed.  Per Dr. Price's 22 note, \"I reviewed the NCCN guidelines and discussed about recommended surveillance of 4-6 months and generally every 6-12 months surveillance exam and imaging by CT or CXR chest/US liver or MR liver for 10 years, then clinically indicated\".     Signature:  Andressa Bowen RN    "

## 2023-09-13 ENCOUNTER — PATIENT OUTREACH (OUTPATIENT)
Dept: ONCOLOGY | Facility: CLINIC | Age: 68
End: 2023-09-13
Payer: COMMERCIAL

## 2023-09-13 DIAGNOSIS — C69.31 MALIGNANT MELANOMA OF CHOROID OF RIGHT EYE (H): Primary | ICD-10-CM

## 2023-09-13 NOTE — PROGRESS NOTES
[facility]: Cancer Care                                                                                          Federica from [facility] called stating that she needs new MRI and CT orders. She said there was a delay in patient's insurance getting Dr. Price added to their system.  She said this has been completed but the orders  tomorrow. She needs new orders and will enter in to the system for prior authorization and then get patient scheduled. Federica requested new orders be faxed to [phone].  Her call back number is [phone].    Message sent to Dr. Price for new orders.    New orders placed and faxed to Federica at [phone]. Right fax confirmed sent.    Called Federica and let her know new orders faxed. She will work on the PA.  She is unsure if patient will be scheduled before her 23 appointment with Dr. Price. Asked that she call me if unable to get scheduled before then.  She verbalized she would.     Signature:  Andressa Bowen RN

## 2023-09-15 ENCOUNTER — LAB REQUISITION (OUTPATIENT)
Dept: LAB | Facility: CLINIC | Age: 68
End: 2023-09-15

## 2023-09-15 ENCOUNTER — PATIENT OUTREACH (OUTPATIENT)
Dept: ONCOLOGY | Facility: CLINIC | Age: 68
End: 2023-09-15
Payer: COMMERCIAL

## 2023-09-15 ENCOUNTER — TRANSFERRED RECORDS (OUTPATIENT)
Dept: HEALTH INFORMATION MANAGEMENT | Facility: CLINIC | Age: 68
End: 2023-09-15
Payer: COMMERCIAL

## 2023-09-15 LAB — LDH SERPL L TO P-CCNC: 215 U/L (ref 0–250)

## 2023-09-15 PROCEDURE — 83615 LACTATE (LD) (LDH) ENZYME: CPT

## 2023-09-15 NOTE — PROGRESS NOTES
[facility]: Cancer Care                                                                                          Received fax from [facility] that the NIRMALA and CT have been approved.  Authorization #:  [id number].    Copy sent to HIM.    Also received fax from [facility] requesting medical records. This was faxed to Medical Records at [phone] @ 018. Right Fax confirmed sent.    Signature:  Andressa Bowen RN

## 2023-09-21 ENCOUNTER — ONCOLOGY VISIT (OUTPATIENT)
Dept: ONCOLOGY | Facility: CLINIC | Age: 68
End: 2023-09-21
Attending: INTERNAL MEDICINE
Payer: COMMERCIAL

## 2023-09-21 VITALS
HEIGHT: 65 IN | WEIGHT: 115.3 LBS | HEART RATE: 69 BPM | DIASTOLIC BLOOD PRESSURE: 85 MMHG | OXYGEN SATURATION: 98 % | BODY MASS INDEX: 19.21 KG/M2 | SYSTOLIC BLOOD PRESSURE: 112 MMHG

## 2023-09-21 DIAGNOSIS — C69.31 MALIGNANT MELANOMA OF CHOROID OF RIGHT EYE (H): Primary | ICD-10-CM

## 2023-09-21 PROCEDURE — G0463 HOSPITAL OUTPT CLINIC VISIT: HCPCS | Performed by: INTERNAL MEDICINE

## 2023-09-21 PROCEDURE — 99214 OFFICE O/P EST MOD 30 MIN: CPT | Performed by: INTERNAL MEDICINE

## 2023-09-21 RX ORDER — MULTIPLE VITAMINS W/ MINERALS TAB 9MG-400MCG
1 TAB ORAL DAILY
COMMUNITY

## 2023-09-21 RX ORDER — MULTIVITAMIN WITH IRON
1 TABLET ORAL DAILY
COMMUNITY

## 2023-09-21 ASSESSMENT — PAIN SCALES - GENERAL: PAINLEVEL: NO PAIN (0)

## 2023-09-21 NOTE — PROGRESS NOTES
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    ECOG Performance    0 - Independent     Pain  Pain Score: No Pain (0)      #. Choroidal melanoma of the right eye  #.  Fatty liver     She is clinically well.  No signs and symptoms suggestive of melanoma recurrence.  I reviewed her labs, CT scan of the chest with contrast and MRI of the abdomen completed on 9/15/2023 in detail with her.  There is no signs of malignancy.  Labs are within normal range.     I recommended follow-up labs and provider visit in 1 year for surveillance.  I reviewed the NCCN guidelines and discussed about recommended surveillance of 4-6 months and generally every 6-12 months surveillance exam and imaging by CT or CXR chest/US liver or MR liver for 10 years, then clinically indicated.   She is recommended to continue follow-up with ophthalmology.   She is advised to call me sooner with any concerns.    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 Further ophthalmologic evaluation concluded choroidal melanoma.   3/3/2022 -no evidence of systemic metastases.    Treatment to date  4/15/2022- right eye enucleation     History of Present Illness    Ms. Barbara Singleton is here for follow up.  She is feeling well.  She follows with dermatologist and ophthalmologist regularly.  She does  "not have any appetite changes.  Weight is stable.  Her energy level is good.  No headaches or new neurologic symptoms.     Review of systems  Apart from describing in HPI, the remainder of comprehensive ROS was negative.    Past History    No past medical history on file.    Past Surgical History:   Procedure Laterality Date    ENUCLEATION Right 4/15/2022    Procedure: ENUCLEATION OR RIGHT EYE;  Surgeon: Mya Posadas MD;  Location: [facility] Main OR    GYN SURGERY      HYSTERECTOMY         Physical Exam    /85 (BP Location: Right arm, Patient Position: Sitting, Cuff Size: Adult Regular)   Pulse 69   Ht 1.651 m (5' 5\")   Wt 52.3 kg (115 lb 4.8 oz)   SpO2 98%   BMI 19.19 kg/m      General: alert, awake, not in acute distress  HEENT: Head: Normal, normocephalic, atraumatic.  Eye: Normal external eye, conjunctiva, lids cornea, KASH.  Pharynx: Normal buccal mucosa. Normal pharynx.  Neck / Thyroid: Supple, no masses, nodes, nodules or enlargement.  Lymphatics: No abnormally enlarged lymph nodes.  Abdomen: abdomen is soft without significant tenderness, masses, organomegaly or guarding  Extremities: normal strength, tone, and muscle mass  Skin: normal. no rash or abnormalities  CNS: non focal.    Lab Results    No results found for this or any previous visit (from the past 168 hour(s)).      Imaging    No results found.    30 minutes spent on the date of the encounter doing chart review, history and exam, documentation, communication of the treatment plan with the care team and further activities as noted above.    Signed by: Claribel Price MD     "

## 2023-09-21 NOTE — Clinical Note
"    9/21/2023         RE: Barbara Singleton  [address] [place] [facility] [address]        Dear Colleague,    Thank you for referring your patient, Barbara Singleton, to the [facility]. Please see a copy of my visit note below.    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    Weight as of 4/15/22: 50 kg (110 lb 3.2 oz). Body surface area is 1.51 meters squared.  Data Unavailable Comment: Data Unavailable   No LMP recorded. Patient is postmenopausal.  Allergies reviewed: Yes  Medications reviewed: Yes    Medications: Medication refills not needed today.  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    ECOG Performance    0 - Independent     Pain  Pain Score: No Pain (0)      #. Choroidal melanoma of the right eye  #. Weight loss, reportedly stabilized.    #.  Fatty liver     She reportedly clinically well.  I reviewed her labs including LDL which is persistently elevated of unknown significance.  I reviewed the CT scan of chest/abdomen/pelvis and that did not show any evidence of " "metastatic disease.  Radiologist recommended MRI liver with and without contrast for better sensitivity.     I recommended follow-up labs and provider visit in 6 months for surveillance.  I reviewed the NCCN guidelines and discussed about recommended surveillance of 4-6 months and generally every 6-12 months surveillance exam and imaging by CT or CXR chest/US liver or MR liver for 10 years, then clinically indicated.   She is recommended to continue follow-up with ophthalmology.   She is advised to call me sooner with any concerns.    Encounter Diagnoses:    Problem List Items Addressed This Visit    None           CC: Physician No Ref-Primary   ______________________________________________________________________________  Diagnosis  2/2022-  She first presented with blurry vision of the right eye with flashes for a couple weeks duration. Further ophthalmologic evaluation concluded choroidal melanoma.   3/3/2022 -no evidence of systemic metastases.    Treatment to date  4/15/2022- right eye enucleation     History of Present Illness    Ms. Barbara Singleton reported she is doing well.  She recovered from surgery well.  She has some indigestion symptoms.  She has good appetite.  She does not have headache.    Review of systems  Apart from describing in HPI, the remainder of comprehensive ROS was negative.    Past History    No past medical history on file.    Past Surgical History:   Procedure Laterality Date     ENUCLEATION Right 4/15/2022    Procedure: ENUCLEATION OR RIGHT EYE;  Surgeon: Mya Posadas MD;  Location: [facility] Main OR     GYN SURGERY       HYSTERECTOMY         Physical Exam    /85 (BP Location: Right arm, Patient Position: Sitting, Cuff Size: Adult Regular)   Pulse 69   Ht 1.651 m (5' 5\")   Wt 52.3 kg (115 lb 4.8 oz)   SpO2 98%   BMI 19.19 kg/m    This is a video visit.  Limited video exam showed well-appearing female.  Not in acute distress.  Artificial right eye.   Skin: normal. " no rash or abnormalities  CNS: non focal.    Lab Results    Recent Results (from the past 168 hour(s))   Lactate Dehydrogenase   Result Value Ref Range    Lactate Dehydrogenase 215 0 - 250 U/L       Imaging    No results found.    Video platform used: [employer]  Video start time: 2:40 PM  Video end time: 2:50 PM    30 minutes spent on the date of the encounter doing chart review, history and exam, documentation and further activities as noted above.    Signed by: Claribel Price MD         Again, thank you for allowing me to participate in the care of your patient.        Sincerely,        Claribel Price MD

## 2023-09-21 NOTE — PROGRESS NOTES
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    Weight as of 4/15/22: 50 kg (110 lb 3.2 oz). Body surface area is 1.51 meters squared.  Data Unavailable Comment: Data Unavailable   No LMP recorded. Patient is postmenopausal.  Allergies reviewed: Yes  Medications reviewed: Yes    Medications: Medication refills not needed today.  Pharmacy name entered into [employer]: [facility] #[id number] - [place], [place] - [facility] [address] [facility] AT [facility] [address]    Clinical concerns: 1 year follow up with prior labs and imaging      Jannette Cheek MA            "

## 2023-10-07 PROBLEM — C69.31 MALIGNANT MELANOMA OF CHOROID OF RIGHT EYE (H): Status: ACTIVE | Noted: 2023-10-07

## 2023-11-07 ENCOUNTER — PATIENT OUTREACH (OUTPATIENT)
Dept: ONCOLOGY | Facility: CLINIC | Age: 68
End: 2023-11-07
Payer: COMMERCIAL

## 2023-11-07 NOTE — PROGRESS NOTES
[facility]: Cancer Care                                                                                          A copy of patient's cancer treatment summary was placed in the mail today. Will follow-up with patient in 1-2 weeks to review the information and answer questions.     Miladys Tate, RN, BSN, OCN  Cancer Survivorship Nurse Coordinator

## 2024-07-10 DIAGNOSIS — C69.31 MALIGNANT MELANOMA OF CHOROID OF RIGHT EYE (H): Primary | ICD-10-CM

## 2024-09-30 ENCOUNTER — PATIENT OUTREACH (OUTPATIENT)
Dept: ONCOLOGY | Facility: HOSPITAL | Age: 69
End: 2024-09-30
Payer: COMMERCIAL

## 2024-09-30 DIAGNOSIS — C69.31 MALIGNANT MELANOMA OF CHOROID OF RIGHT EYE (H): Primary | ICD-10-CM

## 2024-09-30 NOTE — PROGRESS NOTES
[facility]: Cancer Care                                                                                          Patient called and left message requesting lab and Ct orders be faxed to [facility]    Patient scheduled to see Dr. Price on 10/29/24.  Lab orders (CBC, CMP, LDH) already entered. Message sent to Dr. Price for imaging orders.     Faxed lab (CBC, CMP, LDH), MR liver, and CT orders to [facility]:  [phone] at 0839.  Right fax confirmed sent.     Called patient to let her know lab and imaging orders faxed to [facility] this morning.  She expressed appreciation for this and said she's already scheduled for imaging and labs on 10/23/24.    Signature:  Andressa Bowen RN

## 2024-10-01 ENCOUNTER — PATIENT OUTREACH (OUTPATIENT)
Dept: ONCOLOGY | Facility: HOSPITAL | Age: 69
End: 2024-10-01
Payer: COMMERCIAL

## 2024-10-01 NOTE — PROGRESS NOTES
[facility]: Cancer Care                                                                                          Federica from [facility] called stating she needs updated office notes to complete authorization for imaging.  Told her last office note we have is from 9/21/23.  She asked if there was anything sooner than that? I told her no. Her upcoming appointment is for her 1 year follow-up.  She requested office notes be faxed to her at [phone].    Office notes faxed to the number above at [phone].  Right fax confirmed sent.    Signature:  Andressa Bowen RN

## 2024-10-23 ENCOUNTER — TRANSFERRED RECORDS (OUTPATIENT)
Dept: HEALTH INFORMATION MANAGEMENT | Facility: CLINIC | Age: 69
End: 2024-10-23
Payer: COMMERCIAL

## 2024-10-23 ENCOUNTER — LAB REQUISITION (OUTPATIENT)
Dept: LAB | Facility: CLINIC | Age: 69
End: 2024-10-23

## 2024-10-23 PROCEDURE — 83615 LACTATE (LD) (LDH) ENZYME: CPT

## 2024-10-24 LAB — LDH SERPL L TO P-CCNC: 201 U/L (ref 0–250)

## 2024-10-28 ENCOUNTER — PATIENT OUTREACH (OUTPATIENT)
Dept: ONCOLOGY | Facility: HOSPITAL | Age: 69
End: 2024-10-28
Payer: COMMERCIAL

## 2024-10-28 NOTE — PROGRESS NOTES
[facility]: Cancer Care                                                                                          Called [facility] at [phone]-no answer, unable to leave message.  Called [phone], medical records. They will fax results now.    Lab results (CBC, CMP, LDH) and report of MRI of abdomen and CT of the chest received. Copy given to Dr. Price and copies sent to HIM for scanning.    Signature:  Andressa Bowen RN

## 2024-10-29 ENCOUNTER — ONCOLOGY VISIT (OUTPATIENT)
Dept: ONCOLOGY | Facility: HOSPITAL | Age: 69
End: 2024-10-29
Attending: INTERNAL MEDICINE
Payer: COMMERCIAL

## 2024-10-29 VITALS
BODY MASS INDEX: 19.42 KG/M2 | TEMPERATURE: 97.9 F | HEIGHT: 67 IN | RESPIRATION RATE: 16 BRPM | WEIGHT: 123.7 LBS | HEART RATE: 76 BPM | SYSTOLIC BLOOD PRESSURE: 109 MMHG | DIASTOLIC BLOOD PRESSURE: 61 MMHG

## 2024-10-29 DIAGNOSIS — C69.31 MALIGNANT MELANOMA OF CHOROID OF RIGHT EYE (H): Primary | ICD-10-CM

## 2024-10-29 PROCEDURE — G0463 HOSPITAL OUTPT CLINIC VISIT: HCPCS | Performed by: INTERNAL MEDICINE

## 2024-10-29 PROCEDURE — G2211 COMPLEX E/M VISIT ADD ON: HCPCS | Performed by: INTERNAL MEDICINE

## 2024-10-29 PROCEDURE — 99214 OFFICE O/P EST MOD 30 MIN: CPT | Performed by: INTERNAL MEDICINE

## 2024-10-29 ASSESSMENT — PAIN SCALES - GENERAL: PAINLEVEL_OUTOF10: NO PAIN (0)

## 2024-10-29 NOTE — PROGRESS NOTES
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    Weight as of this encounter: 56.1 kg (123 lb 11.2 oz). Body surface area is 1.63 meters squared.  No Pain (0) Comment: Data Unavailable   No LMP recorded. Patient is postmenopausal.  Allergies reviewed: Yes  Medications reviewed: Yes    Medications: Medication refills not needed today.  Pharmacy name entered into [employer]: [facility] #[id number] - [address] AT [facility]    Frailty Screening:   Is the patient here for a new oncology consult visit in cancer care? 2. No      Clinical concerns: Follow up        Cyndi Hill LPN            "

## 2024-10-29 NOTE — LETTER
"10/29/2024      Barbara Singleton  [address] [facility] [address]      Dear Colleague,    Thank you for referring your patient, Barbara Singleton, to the [facility]. Please see a copy of my visit note below.    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    Weight as of this encounter: 56.1 kg (123 lb 11.2 oz). Body surface area is 1.63 meters squared.  No Pain (0) Comment: Data Unavailable   No LMP recorded. Patient is postmenopausal.  Allergies reviewed: Yes  Medications reviewed: Yes    Medications: Medication refills not needed today.  Pharmacy name entered into [employer]: [facility] #[id number] - [place], [place] - [address] [place] [address] [place] [facility]    Frailty Screening:   Is the patient here for a new oncology consult visit in cancer care? 2. No      Clinical concerns: Follow up        Cyndi Hill LPN              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      ECOG Performance    0 - Independent     Pain  Pain Score: No Pain (0)      #.  History of choroidal melanoma of the right eye  #.  Mild " hyperglycemia     She is clinically well.  No signs and symptoms suggestive of melanoma recurrence.  I reviewed her labs, CT scan of the chest with contrast and MRI of the abdomen completed on 10/25/2024 in detail with her.  There is no signs of malignancy.  Labs are within normal range except mildly elevated glucose of 146.  The blood sample was fasting blood sample.  I advised her to follow-up with her primary care provider for evaluation of elevated blood glucose which could be transient or signs of glucose intolerance.  I also offered her to get hemoglobin A1c here today.  She preferred to follow-up with her primary care provider locally.     I recommended follow-up labs and provider visit in 1 year for surveillance.  I reviewed the NCCN guidelines and discussed about recommended surveillance of 4-6 months and generally every 6-12 months surveillance exam and imaging by CT or CXR chest/US liver or MR liver for 10 years, then clinically indicated.   She is recommended to continue follow-up with ophthalmology.   She is advised to call me sooner with any concerns.    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 Further ophthalmologic evaluation concluded choroidal melanoma.   3/3/2022 -no evidence of systemic metastases.    Treatment to date  4/15/2022- right eye enucleation     History of Present Illness    Ms. Barbara Singleton is here for follow up.  She is feeling well.  She follows with dermatologist and  "ophthalmologist regularly.  She does not have any appetite changes.  Weight is stable.  Her energy level is good.  No headaches or vision changes or new neurologic symptoms.  She told me that her vision is adapted.    Review of systems  Apart from describing in HPI, the remainder of comprehensive ROS was negative.    Past History    No past medical history on file.    Past Surgical History:   Procedure Laterality Date     ENUCLEATION Right 4/15/2022    Procedure: ENUCLEATION OR RIGHT EYE;  Surgeon: Mya Posadas MD;  Location: [facility] Main OR     GYN SURGERY       HYSTERECTOMY         Physical Exam    /61 (BP Location: Right arm, Patient Position: Sitting, Cuff Size: Adult Regular)   Pulse 76   Temp 97.9  F (36.6  C) (Tympanic)   Resp 16   Ht 1.702 m (5' 7\")   Wt 56.1 kg (123 lb 11.2 oz)   BMI 19.37 kg/m      General: alert, awake, not in acute distress  HEENT: Head: Normal, normocephalic, atraumatic.  Eye: Normal external eye, conjunctiva, lids cornea, KASH.  Pharynx: Normal buccal mucosa. Normal pharynx.  Neck / Thyroid: Supple, no masses, nodes, nodules or enlargement.  Lymphatics: No abnormally enlarged lymph nodes.  Chest: Normal chest wall and respirations. Clear to auscultation.  Heart: S1 S2 RRR.   Abdomen: abdomen is soft without significant tenderness, masses, organomegaly or guarding  Extremities: normal strength, tone, and muscle mass  Skin: normal. no rash or abnormalities  CNS: non focal.    Lab Results    No results found for this or any previous visit (from the past week).      Imaging    No results found.    The longitudinal plan of care for the diagnosis(es)/condition(s) as documented were addressed during this visit. Due to the added complexity in care, I will continue to support Barbara in the subsequent management and with ongoing continuity of care.     30 minutes spent by me on the date of the encounter doing chart review, history and exam, documentation and further " activities as noted above.    Signed by: Claribel Prcie MD         Again, thank you for allowing me to participate in the care of your patient.        Sincerely,        Claribel Price MD

## 2024-10-31 NOTE — PROGRESS NOTES
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      ECOG Performance    0 - Independent     Pain  Pain Score: No Pain (0)      #.  History of choroidal melanoma of the right eye  #.  Mild hyperglycemia     She is clinically well.  No signs and symptoms suggestive of melanoma recurrence.  I reviewed her labs, CT scan of the chest with contrast and MRI of the abdomen completed on 10/25/2024 in detail with her.  There is no signs of malignancy.  Labs are within normal range except mildly elevated glucose of 146.  The blood sample was fasting blood sample.  I advised her to follow-up with her primary care provider for evaluation of elevated blood glucose which could be transient or signs of glucose intolerance.  I also offered her to get hemoglobin A1c here today.  She preferred to follow-up with her primary care provider locally.     I recommended follow-up labs and provider visit in 1 year for surveillance.  I reviewed the NCCN guidelines and discussed about recommended surveillance of 4-6 months and generally every 6-12 months surveillance exam and imaging by CT or CXR chest/US liver or MR liver for 10 years, then clinically indicated.   She is recommended to continue follow-up with ophthalmology.   She is advised to call me sooner with any concerns.    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 Further ophthalmologic evaluation concluded choroidal melanoma.   3/3/2022 -no evidence of systemic metastases.    Treatment to date  4/15/2022- right eye enucleation     History of Present Illness    Ms. Barbara Singleton is here for follow up.  She is feeling well.  She follows with dermatologist and ophthalmologist regularly.  She does not have any appetite changes.  Weight is stable.  Her energy level is good.  No headaches or vision changes or new neurologic symptoms.  She told me that her vision is adapted.    Review of systems  Apart from describing in HPI, the remainder of comprehensive ROS was negative.    Past History    No past medical history on file.    Past Surgical History:   Procedure Laterality Date    ENUCLEATION Right 4/15/2022    Procedure: ENUCLEATION OR RIGHT EYE;  Surgeon: Mya Posadas MD;  Location: [facility] Main OR    GYN SURGERY      HYSTERECTOMY         Physical Exam    /61 (BP Location: Right arm, Patient Position: Sitting, Cuff Size: Adult Regular)   Pulse 76   Temp 97.9  F (36.6  C) (Tympanic)   Resp 16   Ht 1.702 m (5' 7\")   Wt 56.1 kg (123 lb 11.2 oz)   BMI 19.37 kg/m      General: alert, awake, not in acute distress  HEENT: Head: Normal, normocephalic, atraumatic.  Eye: Normal external eye, conjunctiva, lids cornea, KASH.  Pharynx: Normal buccal mucosa. Normal pharynx.  Neck / Thyroid: Supple, no masses, nodes, nodules or enlargement.  Lymphatics: No abnormally enlarged lymph nodes.  Chest: Normal chest wall and respirations. Clear to auscultation.  Heart: S1 S2 RRR.   Abdomen: abdomen is soft without significant tenderness, masses, organomegaly or guarding  Extremities: normal strength, tone, and muscle mass  Skin: normal. no rash or " abnormalities  CNS: non focal.    Lab Results    No results found for this or any previous visit (from the past week).      Imaging    No results found.    The longitudinal plan of care for the diagnosis(es)/condition(s) as documented were addressed during this visit. Due to the added complexity in care, I will continue to support Barbara in the subsequent management and with ongoing continuity of care.     30 minutes spent by me on the date of the encounter doing chart review, history and exam, documentation and further activities as noted above.    Signed by: Claribel Price MD

## (undated) DEVICE — ESU PENCIL SMOKE EVAC W/ROCKER SWITCH 0703-047-000

## (undated) DEVICE — SYR 05ML LL W/O NDL

## (undated) DEVICE — MARKER SURG SKIN STRL 77734

## (undated) DEVICE — PLATE GROUNDING ADULT W/CORD 9165L

## (undated) DEVICE — NDL 27GA 1.25" 305136

## (undated) DEVICE — SU ABSRB 6-0 18INL MONOFILAMENT 3/8 CIRCLE G-1 MICR 774G

## (undated) DEVICE — GLOVE BIOGEL PI ULTRATOUCH G SZ 6.5 42165

## (undated) DEVICE — PREP POVIDONE IODINE SOLUTION 10% 4OZ BOTTLE 29906-004

## (undated) DEVICE — DRSG GAUZE 4X4" TRAY 6939

## (undated) DEVICE — APPLICATOR COTTON TIP 3IN STRL 32-1521

## (undated) DEVICE — PREP PAD ALCOHOL 2PLY MED STRL APP102A

## (undated) DEVICE — GOWN IMPERVIOUS BREATHABLE SMART LG 89015

## (undated) DEVICE — CUSTOM PACK MINOR SBA5BMNHEA

## (undated) DEVICE — SOL WATER IRRIG 1000ML BOTTLE 2F7114

## (undated) DEVICE — TUBING SUCTION MEDI-VAC 1/4"X20' N620A - HE

## (undated) DEVICE — DRSG EYE PAD STERILE 1.63X2.63" NON21600

## (undated) DEVICE — ESU ELEC NDL 1" COATED/INSULATED E1465

## (undated) DEVICE — SYR 10ML PREFILLED 0.9% NACL INJ NOT STERILE 306547

## (undated) DEVICE — DECANTER BAG 2002S

## (undated) DEVICE — DRAPE U SPLIT 74X120" 29440

## (undated) DEVICE — ADH LIQUID MASTISOL TOPICAL VIAL 2-3ML 0523-48

## (undated) DEVICE — SOL NACL 0.9% IRRIG 1000ML BOTTLE 2F7124

## (undated) DEVICE — Device

## (undated) RX ORDER — FENTANYL CITRATE 50 UG/ML
INJECTION, SOLUTION INTRAMUSCULAR; INTRAVENOUS
Status: DISPENSED
Start: 2022-04-15